# Patient Record
Sex: FEMALE | Race: BLACK OR AFRICAN AMERICAN | NOT HISPANIC OR LATINO | URBAN - METROPOLITAN AREA
[De-identification: names, ages, dates, MRNs, and addresses within clinical notes are randomized per-mention and may not be internally consistent; named-entity substitution may affect disease eponyms.]

---
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
## 2025-03-31 NOTE — ED ADULT NURSE NOTE - CAS DISCH BELONGINGS RETURNED
Chief Complaint: .Ambreen Buenrostro is here today for   Chief Complaint   Patient presents with    Physical     Annual exam          Medications: medications verified and updated    Refills needed today?  NO    Latex allergy or sensitivity: No known latex allergy     Patient would like communication of their results via:    Cell Phone:   Telephone Information:   Mobile [phone]     Okay to leave a message containing results? Yes    Patient's current [facility] status: Active.    Advanced directives: n/a    Care Teams Verified: No Changes    Depression Screen: yes 0    Tobacco history: verified       Health Maintenance       Shingles Vaccine (1 of 2)  Never done    Pneumococcal Vaccine 50+ (1 of 1 - PCV)  Never done    DTaP/Tdap/Td Vaccine (2 - Td or Tdap)  Overdue since 1/1/2019    Influenza Vaccine (1)  Never done    COVID-19 Vaccine (1 - 2024-25 season)  Never done           Following review of the above:  Patient is not proceeding with: COVID-19, Dtap/Tdap/Td, Pneumococcal, and Shingles    Note: Refer to final orders and clinician documentation.             Not applicable